# Patient Record
Sex: FEMALE | Race: BLACK OR AFRICAN AMERICAN | NOT HISPANIC OR LATINO | URBAN - METROPOLITAN AREA
[De-identification: names, ages, dates, MRNs, and addresses within clinical notes are randomized per-mention and may not be internally consistent; named-entity substitution may affect disease eponyms.]

---

## 2024-08-19 ENCOUNTER — APPOINTMENT (OUTPATIENT)
Dept: URBAN - METROPOLITAN AREA CLINIC 255 | Age: 19
Setting detail: DERMATOLOGY
End: 2024-08-20

## 2024-08-19 VITALS — HEIGHT: 67 IN | WEIGHT: 115 LBS

## 2024-08-19 DIAGNOSIS — L30.9 DERMATITIS, UNSPECIFIED: ICD-10-CM

## 2024-08-19 PROCEDURE — 99203 OFFICE O/P NEW LOW 30 MIN: CPT

## 2024-08-19 PROCEDURE — OTHER OTC TREATMENT REGIMEN: OTHER

## 2024-08-19 PROCEDURE — OTHER COUNSELING: OTHER

## 2024-08-19 PROCEDURE — OTHER MIPS QUALITY: OTHER

## 2024-08-19 PROCEDURE — OTHER PATIENT SPECIFIC COUNSELING: OTHER

## 2024-08-19 PROCEDURE — OTHER PHOTO-DOCUMENTATION: OTHER

## 2024-08-19 ASSESSMENT — BSA RASH: BSA RASH: 1

## 2024-08-19 ASSESSMENT — SEVERITY ASSESSMENT: SEVERITY: MILD

## 2024-08-19 ASSESSMENT — ITCH NUMERIC RATING SCALE: HOW SEVERE IS YOUR ITCHING?: 8

## 2024-08-19 ASSESSMENT — PAIN INTENSITY VAS: HOW INTENSE IS YOUR PAIN 0 BEING NO PAIN, 10 BEING THE MOST SEVERE PAIN POSSIBLE?: 10/10 PAIN

## 2024-08-19 NOTE — PROCEDURE: OTC TREATMENT REGIMEN
Patient Specific Otc Recommendations (Will Not Stick From Patient To Patient): Lotrimin or Lamisil
Samples Given: Dr. Dan’s Coribalm
Detail Level: Zone

## 2024-08-19 NOTE — PROCEDURE: PATIENT SPECIFIC COUNSELING
-Recommended patient use Dr. Dan’s Cortibalm to affected areas as needed. Reviewed steroid risks and benefits.\\n-Discussed if rash is not responding to topical steroid, to try OTC Lotrimin or Lamisil which are anti-fungal.\\n-Assured patient rash does not appear to be herpes on exam today.\\n-Recommended patient RTC in 2 weeks if rash is not resolving.\\n-Patient agreeable with plan.\\n-If cortibalm is improving but not resolving rash, patient may call for prescription topical steroid. 
Detail Level: Zone